# Patient Record
Sex: FEMALE | Race: WHITE
[De-identification: names, ages, dates, MRNs, and addresses within clinical notes are randomized per-mention and may not be internally consistent; named-entity substitution may affect disease eponyms.]

---

## 2022-10-20 ENCOUNTER — HOSPITAL ENCOUNTER (EMERGENCY)
Dept: HOSPITAL 47 - EC | Age: 37
Discharge: HOME | End: 2022-10-20
Payer: COMMERCIAL

## 2022-10-20 VITALS — SYSTOLIC BLOOD PRESSURE: 111 MMHG | DIASTOLIC BLOOD PRESSURE: 73 MMHG | HEART RATE: 98 BPM

## 2022-10-20 VITALS — TEMPERATURE: 98.6 F

## 2022-10-20 VITALS — RESPIRATION RATE: 18 BRPM

## 2022-10-20 DIAGNOSIS — Z91.040: ICD-10-CM

## 2022-10-20 DIAGNOSIS — I48.91: ICD-10-CM

## 2022-10-20 DIAGNOSIS — Z88.1: ICD-10-CM

## 2022-10-20 DIAGNOSIS — J45.909: ICD-10-CM

## 2022-10-20 DIAGNOSIS — Z91.012: ICD-10-CM

## 2022-10-20 DIAGNOSIS — Z88.0: ICD-10-CM

## 2022-10-20 DIAGNOSIS — T78.01XA: Primary | ICD-10-CM

## 2022-10-20 DIAGNOSIS — Z91.018: ICD-10-CM

## 2022-10-20 PROCEDURE — 96374 THER/PROPH/DIAG INJ IV PUSH: CPT

## 2022-10-20 PROCEDURE — 99284 EMERGENCY DEPT VISIT MOD MDM: CPT

## 2022-10-20 NOTE — ED
Allergic Reaction HPI





- General


Chief complaint: Allergic Reaction


Stated complaint: Finger orallergic reaction - peanut exposure


Time Seen by Provider: 10/20/22 16:07





- History of Present Illness


Initial Comments: 


Patient is a 37-year-old  female presenting to the emergency room via 

EMS after a severe ALLERGIC reaction to peanuts earlier today requiring her to 

utilize one of her EpiPen. She administered her epinephrine at 1520 today and 

subsequently notified EMS for transportation to the hospital. She began to have 

increased airway tightening while on route to the emergency room and was given 

Solu-Medrol by EMS. Along with IV Benadryl 25 mg in addition to the oral dose 

that she had her knee taken. She reports that she has had ALLERGIC reaction 

similar to this to peanuts in the past and responded well to typical course of 

treatment. At this time she denies any shortness of breath does report some 

throat itching but overall is feeling much better. She denies any chest pain, 

abdominal pain, nausea or vomiting. She has a past medical history significant 

for DVT, GERD, asthma, hypothyroidism and paroxysmal A. fib.





- Related Data


                                  Previous Rx's











 Medication  Instructions  Recorded


 


EPINEPHrine (Auto Inject) [Epipen] 0.3 mg IM ONCE PRN 1 Days #1 each 10/20/22











                                    Allergies











Allergy/AdvReac Type Severity Reaction Status Date / Time


 


egg Allergy  Anaphylaxis Verified 10/20/22 16:26


 


latex Allergy  Rash/Hives Verified 10/20/22 16:26


 


neomycin Allergy  Rash/Hives Verified 10/20/22 16:26


 


olive extract Allergy  Rash/Hives Verified 10/20/22 16:26


 


olive oil Allergy  Rash/Hives Verified 10/20/22 16:26


 


peanut Allergy  Anaphylaxis Verified 10/20/22 16:26


 


Penicillins Allergy  Rash/Hives Verified 10/20/22 16:26


 


rice Allergy  Anaphylaxis Verified 10/20/22 16:26


 


erythromycin base AdvReac  Nausea & Verified 10/20/22 16:26





   Vomiting  














Review of Systems


ROS Statement: 


Those systems with pertinent positive or pertinent negative responses have been 

documented in the HPI.





ROS Other: All systems not noted in ROS Statement are negative.





Past Medical History


Past Medical History: Atrial Fibrillation, Asthma, Deep Vein Thrombosis (DVT), 

GERD/Reflux, Musculoskeletal Disorder, Thyroid Disorder


History of Any Multi-Drug Resistant Organisms: None Reported


Past Surgical History: Adenoidectomy, Hysterectomy, Orthopedic Surgery (Left 

radius 2005), Tonsillectomy


Past Anesthesia/Blood Transfusion Reactions: No Reported Reaction


Past Psychological History: No Psychological Hx Reported


Smoking Status: Never smoker


Past Alcohol Use History: Rare


Past Drug Use History: None Reported





General Exam


General appearance: alert, in no apparent distress


Head exam: Present: atraumatic, normocephalic, normal inspection


Eye exam: Present: normal appearance, PERRL, EOMI.  Absent: scleral icterus, 

conjunctival injection, periorbital swelling


ENT exam: Present: mucous membranes moist, other (Mild  oropharynx erythema and 

edema no exudate airway patent)


  ** Expanded


Ear exam: Present: normal external inspection


Mouth exam: Present: normal external inspection


Teeth exam: Present: normal inspection


Neck exam: Present: normal inspection, full ROM, lymphadenopathy.  Absent: 

tenderness


Respiratory exam: Present: normal lung sounds bilaterally.  Absent: respiratory 

distress, wheezes, rales, rhonchi, stridor


Cardiovascular Exam: Present: regular rate, normal rhythm, normal heart sounds. 

 Absent: systolic murmur, diastolic murmur, rubs, gallop, clicks


GI/Abdominal exam: Present: soft, normal bowel sounds.  Absent: distended, 

tenderness, guarding, rebound, rigid


Rectal exam: Present: deferred


Extremities exam: Present: normal inspection.  Absent: pedal edema, joint 

swelling


Back exam: Present: normal inspection


Neurological exam: Present: alert, oriented X3, CN II-XII intact


Psychiatric exam: Present: normal affect, normal mood


Skin exam: Present: erythema (Blanchable bilateral cheeks)





Course


                                   Vital Signs











  10/20/22 10/20/22





  16:12 18:09


 


Temperature 98.6 F 


 


Pulse Rate 94 98


 


Respiratory 18 18





Rate  


 


Blood Pressure 134/74 111/73


 


O2 Sat by Pulse 100 97





Oximetry  














Medical Decision Making





- Medical Decision Making


37-year-old  female presenting after ALLERGIC reaction to peanuts with 

use of her EpiPen. Increased respiratory difficulty in the ambulance resulted in

 her receiving IV Benadryl and IV Solu-Medrol 125 mg. She also took an oral dose

 of Benadryl 50 mg when she administered her epinephrine pen. Will defer further

 steroids and Benadryl. Will give a dose of IV Pepcid to complete the allergic 

response cocktail. No evidence of significant tachycardia or hypotension will 

complete 500 mL of IV fluids from EMS no further IV fluids. No indication for 

any diagnostic imaging or laboratory studies at this time will monitor.





Throat scratching improved with dose of IV Pepcid. No recurrence of shortness of

 breath or feeling of airway tightness. No rash or hives noted and vital signs 

remained stable. Will discharge home with refill of her EpiPen. Reinforced 

avoiding allergens when possible and usage of EpiPen. Advised to follow-up with 

her primary care provider.





Case discussed with Dr. Arce.





Disposition


Clinical Impression: 


 Allergic reaction





Disposition: HOME SELF-CARE


Instructions (If sedation given, give patient instructions):  Anaphylaxis (ED), 

Peanut Allergy (ED)


Additional Instructions: 


Please follow-up with your primary care provider. Please obtain your refill of 

your epinephrine pen. Please continue to avoid peanuts and other allergens when 

possible. Please return to the Emergency Department if symptoms worsen or any 

other concerns.


Prescriptions: 


EPINEPHrine (Auto Inject) [Epipen] 0.3 mg IM ONCE PRN 1 Days #1 each


 PRN Reason: Anaphylaxis


Is patient prescribed a controlled substance at d/c from ED?: No


Referrals: 


Nonstaff,Physician [Primary Care Provider] - 1-2 days


Time of Disposition: 17:41